# Patient Record
Sex: MALE | Race: WHITE | HISPANIC OR LATINO
[De-identification: names, ages, dates, MRNs, and addresses within clinical notes are randomized per-mention and may not be internally consistent; named-entity substitution may affect disease eponyms.]

---

## 2023-04-04 PROBLEM — Z00.00 ENCOUNTER FOR PREVENTIVE HEALTH EXAMINATION: Status: ACTIVE | Noted: 2023-04-04

## 2023-04-18 ENCOUNTER — APPOINTMENT (OUTPATIENT)
Dept: ORTHOPEDIC SURGERY | Facility: CLINIC | Age: 53
End: 2023-04-18
Payer: MEDICARE

## 2023-04-18 VITALS
WEIGHT: 170 LBS | HEART RATE: 74 BPM | OXYGEN SATURATION: 96 % | SYSTOLIC BLOOD PRESSURE: 137 MMHG | BODY MASS INDEX: 28.32 KG/M2 | HEIGHT: 65 IN | DIASTOLIC BLOOD PRESSURE: 91 MMHG

## 2023-04-18 DIAGNOSIS — M54.16 RADICULOPATHY, LUMBAR REGION: ICD-10-CM

## 2023-04-18 DIAGNOSIS — Z98.1 ARTHRODESIS STATUS: ICD-10-CM

## 2023-04-18 PROCEDURE — 99204 OFFICE O/P NEW MOD 45 MIN: CPT

## 2023-04-27 PROBLEM — M54.16 CHRONIC LUMBAR RADICULOPATHY: Status: ACTIVE | Noted: 2023-04-27

## 2023-04-27 PROBLEM — Z98.1 S/P LUMBAR FUSION: Status: ACTIVE | Noted: 2023-04-27

## 2023-04-27 NOTE — HISTORY OF PRESENT ILLNESS
[de-identified] : 2012 L4/5 surgery.  Pain free until 2022, developed pain and worsened over the past 6 months.  Pain is low back pressure and tightness.  Feels trembling of the left leg.  In September 2022 had cervical fusion.  referred by dr. wood for evaluation.

## 2023-04-27 NOTE — DISCUSSION/SUMMARY
[de-identified] : Unclear source of pain.  no sign of structural disease.  Recommend pain management approach.  all questions answered

## 2023-04-27 NOTE — PHYSICAL EXAM
[de-identified] : Physical Exam:\par \par General: patient is well developed, well nourished, in no acute \par distress, alert and oriented x 3. \par \par Mood and affect: normal\par \par Respiratory: no respiratory distress noted\par \par Skin: well healed\par \par Alignment:The spine is well compensated in the coronal and sagittal plane.  \par \par Gait: The patient is able to toe walk and heel walk without difficulty. The patient is able to tandem gait without difficutly.\par \par Palpation: no tenderness to palpation spine or paraspinal region\par \par Range of motion: Lumbar spine ROM is full\par \par Neurologic Exam:\par Motor: Manual Muscle testing in the lower extremities is 5 out of 5 in all muscle groups. There is no evidence of muscular atrophy in the lower extremities. Sensory: Sensation to light touch is grossly intact in the lower extremities\par \par Reflexes: DTR are present and symmetric throughout, no clonus, plantar responses are flexor\par \par Hip Exam: No pain with internal or external rotation of hips bilaterally\par \par Special tests: Straight leg raise test negative.  Cross straight leg test negative.  JOSE test negative\par \par Vascular: Examination of the peripheral vascular system demonstrates no evidence of congestion or edema. no evidence of lymphedema bilateral lower extremities, pulses are present and symmetric in both lower extremities. [de-identified] : xray 3/15/2023: L4/5 fusion and instrumentation in appropriate position.  no adjacent segment disease seen\par \par MRI 3/15/2023: no stenosis, well deocmpressed.  no adjacent segment disease.